# Patient Record
Sex: FEMALE | HISPANIC OR LATINO | URBAN - METROPOLITAN AREA
[De-identification: names, ages, dates, MRNs, and addresses within clinical notes are randomized per-mention and may not be internally consistent; named-entity substitution may affect disease eponyms.]

---

## 2017-11-09 ENCOUNTER — FOLLOW UP ESTABLISHED (OUTPATIENT)
Dept: URBAN - METROPOLITAN AREA CLINIC 44 | Facility: CLINIC | Age: 67
End: 2017-11-09
Payer: MEDICARE

## 2017-11-09 DIAGNOSIS — H04.123 DRY EYE SYNDROME OF BILATERAL LACRIMAL GLANDS: Primary | ICD-10-CM

## 2017-11-09 PROCEDURE — 92014 COMPRE OPH EXAM EST PT 1/>: CPT | Performed by: OPTOMETRIST

## 2017-11-09 ASSESSMENT — VISUAL ACUITY
OS: 20/20
OD: 20/20

## 2017-11-09 ASSESSMENT — INTRAOCULAR PRESSURE
OD: 18
OS: 18

## 2017-11-09 ASSESSMENT — KERATOMETRY
OD: 42.75
OS: 43.13

## 2018-12-27 ENCOUNTER — FOLLOW UP ESTABLISHED (OUTPATIENT)
Dept: URBAN - METROPOLITAN AREA CLINIC 44 | Facility: CLINIC | Age: 68
End: 2018-12-27
Payer: MEDICARE

## 2018-12-27 DIAGNOSIS — H25.13 AGE-RELATED NUCLEAR CATARACT, BILATERAL: Primary | ICD-10-CM

## 2018-12-27 PROCEDURE — 92014 COMPRE OPH EXAM EST PT 1/>: CPT | Performed by: OPTOMETRIST

## 2018-12-27 ASSESSMENT — INTRAOCULAR PRESSURE
OS: 13
OD: 13

## 2018-12-27 ASSESSMENT — VISUAL ACUITY
OD: 20/20
OS: 20/25

## 2018-12-27 ASSESSMENT — KERATOMETRY
OS: 43.25
OD: 42.88

## 2020-11-20 ENCOUNTER — FOLLOW UP ESTABLISHED (OUTPATIENT)
Dept: URBAN - METROPOLITAN AREA CLINIC 44 | Facility: CLINIC | Age: 70
End: 2020-11-20
Payer: MEDICARE

## 2020-11-20 PROCEDURE — 92014 COMPRE OPH EXAM EST PT 1/>: CPT | Performed by: OPTOMETRIST

## 2020-11-20 ASSESSMENT — KERATOMETRY
OS: 43.25
OD: 42.75

## 2020-11-20 ASSESSMENT — INTRAOCULAR PRESSURE
OD: 14
OS: 14

## 2020-11-20 ASSESSMENT — VISUAL ACUITY
OD: 20/25
OS: 20/25

## 2021-12-17 ENCOUNTER — OFFICE VISIT (OUTPATIENT)
Dept: URBAN - METROPOLITAN AREA CLINIC 44 | Facility: CLINIC | Age: 71
End: 2021-12-17
Payer: MEDICARE

## 2021-12-17 PROCEDURE — 92014 COMPRE OPH EXAM EST PT 1/>: CPT | Performed by: OPTOMETRIST

## 2021-12-17 PROCEDURE — 92134 CPTRZ OPH DX IMG PST SGM RTA: CPT | Performed by: OPTOMETRIST

## 2021-12-17 ASSESSMENT — INTRAOCULAR PRESSURE
OD: 14
OS: 14

## 2021-12-17 ASSESSMENT — KERATOMETRY
OD: 42.75
OS: 43.13

## 2021-12-17 ASSESSMENT — VISUAL ACUITY
OD: 20/20
OS: 20/25

## 2021-12-17 NOTE — IMPRESSION/PLAN
Impression: Age-related nuclear cataract, bilateral: H25.13. Plan: Cataracts account for the patient's complaints. Patient prefers no treatment at this time, will monitor vision changes and contact us with any decrease in vision, will re-evaluate cataract on return visit.

## 2022-12-14 ENCOUNTER — OFFICE VISIT (OUTPATIENT)
Dept: URBAN - METROPOLITAN AREA CLINIC 44 | Facility: CLINIC | Age: 72
End: 2022-12-14
Payer: MEDICARE

## 2022-12-14 DIAGNOSIS — H25.13 AGE-RELATED NUCLEAR CATARACT, BILATERAL: ICD-10-CM

## 2022-12-14 DIAGNOSIS — H04.123 DRY EYE SYNDROME OF BILATERAL LACRIMAL GLANDS: Primary | ICD-10-CM

## 2022-12-14 PROCEDURE — 92134 CPTRZ OPH DX IMG PST SGM RTA: CPT | Performed by: OPTOMETRIST

## 2022-12-14 PROCEDURE — 92014 COMPRE OPH EXAM EST PT 1/>: CPT | Performed by: OPTOMETRIST

## 2022-12-14 ASSESSMENT — KERATOMETRY
OS: 43.25
OD: 42.88

## 2022-12-14 ASSESSMENT — INTRAOCULAR PRESSURE
OD: 16
OS: 14

## 2022-12-14 ASSESSMENT — VISUAL ACUITY
OD: 20/25
OS: 20/30

## 2022-12-14 NOTE — IMPRESSION/PLAN
Impression: Dry eye syndrome of bilateral lacrimal glands Plan: Patient stable, continue artificial tears PRN.

## 2024-03-01 ENCOUNTER — OFFICE VISIT (OUTPATIENT)
Dept: URBAN - METROPOLITAN AREA CLINIC 44 | Facility: CLINIC | Age: 74
End: 2024-03-01
Payer: MEDICARE

## 2024-03-01 DIAGNOSIS — H43.813 VITREOUS DEGENERATION, BILATERAL: ICD-10-CM

## 2024-03-01 DIAGNOSIS — H04.123 DRY EYE SYNDROME OF BILATERAL LACRIMAL GLANDS: Primary | ICD-10-CM

## 2024-03-01 DIAGNOSIS — H52.4 PRESBYOPIA: ICD-10-CM

## 2024-03-01 DIAGNOSIS — H25.13 AGE-RELATED NUCLEAR CATARACT, BILATERAL: ICD-10-CM

## 2024-03-01 PROCEDURE — 99214 OFFICE O/P EST MOD 30 MIN: CPT | Performed by: OPTOMETRIST

## 2024-03-01 PROCEDURE — 92134 CPTRZ OPH DX IMG PST SGM RTA: CPT | Performed by: OPTOMETRIST

## 2024-03-01 ASSESSMENT — INTRAOCULAR PRESSURE
OS: 11
OD: 11

## 2024-03-01 ASSESSMENT — VISUAL ACUITY
OD: 20/25
OS: 20/30

## 2024-03-01 ASSESSMENT — KERATOMETRY
OD: 42.88
OS: 43.00

## 2025-03-07 ENCOUNTER — OFFICE VISIT (OUTPATIENT)
Dept: URBAN - METROPOLITAN AREA CLINIC 44 | Facility: CLINIC | Age: 75
End: 2025-03-07
Payer: MEDICARE

## 2025-03-07 DIAGNOSIS — H43.813 VITREOUS DEGENERATION, BILATERAL: Primary | ICD-10-CM

## 2025-03-07 DIAGNOSIS — H04.123 DRY EYE SYNDROME OF BILATERAL LACRIMAL GLANDS: ICD-10-CM

## 2025-03-07 DIAGNOSIS — H25.813 COMBINED FORMS OF AGE-RELATED CATARACT, BILATERAL: ICD-10-CM

## 2025-03-07 ASSESSMENT — VISUAL ACUITY
OS: 20/40
OD: 20/25

## 2025-03-07 ASSESSMENT — KERATOMETRY
OD: 42.63
OS: 43.00

## 2025-03-07 ASSESSMENT — INTRAOCULAR PRESSURE
OS: 17
OD: 17